# Patient Record
Sex: MALE | Race: WHITE | NOT HISPANIC OR LATINO | Employment: OTHER | URBAN - METROPOLITAN AREA
[De-identification: names, ages, dates, MRNs, and addresses within clinical notes are randomized per-mention and may not be internally consistent; named-entity substitution may affect disease eponyms.]

---

## 2021-01-14 DIAGNOSIS — Z23 NEED FOR VACCINATION: ICD-10-CM

## 2021-09-02 ENCOUNTER — APPOINTMENT (OUTPATIENT)
Dept: RADIOLOGY | Facility: HOSPITAL | Age: 76
End: 2021-09-02
Payer: MEDICARE

## 2021-09-02 ENCOUNTER — HOSPITAL ENCOUNTER (OUTPATIENT)
Facility: HOSPITAL | Age: 76
Setting detail: OBSERVATION
LOS: 1 days | Discharge: HOME | End: 2021-09-03
Attending: EMERGENCY MEDICINE | Admitting: INTERNAL MEDICINE
Payer: MEDICARE

## 2021-09-02 DIAGNOSIS — U07.1 LOWER RESPIRATORY TRACT INFECTION DUE TO COVID-19 VIRUS: Primary | ICD-10-CM

## 2021-09-02 DIAGNOSIS — U07.1 COVID-19: ICD-10-CM

## 2021-09-02 DIAGNOSIS — J22 LOWER RESPIRATORY TRACT INFECTION DUE TO COVID-19 VIRUS: Primary | ICD-10-CM

## 2021-09-02 DIAGNOSIS — R09.02 HYPOXIA: ICD-10-CM

## 2021-09-02 LAB
ALBUMIN SERPL-MCNC: 3.8 G/DL (ref 3.5–5.3)
ALP SERPL-CCNC: 52 U/L (ref 45–115)
ALT SERPL-CCNC: 18 U/L (ref 7–52)
ANION GAP SERPL CALC-SCNC: 7 MMOL/L (ref 3–11)
AST SERPL-CCNC: 39 U/L
BACTERIA #/AREA URNS AUTO: NORMAL /HPF
BASOPHILS # BLD AUTO: 0 10*3/UL
BASOPHILS NFR BLD AUTO: 1 % (ref 0–2)
BILIRUB SERPL-MCNC: 0.45 MG/DL (ref 0.2–1.4)
BILIRUB UR QL STRIP.AUTO: NEGATIVE
BUN SERPL-MCNC: 13 MG/DL (ref 7–25)
CALCIUM ALBUM COR SERPL-MCNC: 9 MG/DL (ref 8.6–10.3)
CALCIUM SERPL-MCNC: 8.8 MG/DL (ref 8.6–10.3)
CHLORIDE SERPL-SCNC: 104 MMOL/L (ref 98–107)
CK SERPL-CCNC: 131 U/L (ref 39–308)
CLARITY UR: CLEAR
CO2 SERPL-SCNC: 24 MMOL/L (ref 21–32)
COLOR UR: YELLOW
CREAT SERPL-MCNC: 0.89 MG/DL (ref 0.7–1.3)
CRP SERPL-MCNC: 33.7 MG/L
DELTA HIGH SENSITIVE TROPONIN I, LATE DRAW 2 HOUR: -5.5
DELTA HIGH SENSITIVITY TROPONIN I, 1 HOUR: -2.8
EOSINOPHIL # BLD AUTO: 0 10*3/UL
EOSINOPHIL NFR BLD AUTO: 0 % (ref 0–3)
ERYTHROCYTE [DISTWIDTH] IN BLOOD BY AUTOMATED COUNT: 15.1 % (ref 11.5–15)
ERYTHROCYTE [SEDIMENTATION RATE] IN BLOOD: 55 MM/HR
FERRITIN SERPL-MCNC: 211.9 NG/ML (ref 24–336)
FIBRIN D-DIMER (NG/ML) IN PLATELET POOR PLASMA: 683 NG/ML FEU
FIBRINOGEN PPP-MCNC: 543 MG/DL (ref 200–393)
GFR SERPL CREATININE-BSD FRML MDRD: 82 ML/MIN/1.73M*2
GLUCOSE BLDC GLUCOMTR-SCNC: 82 MG/DL (ref 70–105)
GLUCOSE BLDC GLUCOMTR-SCNC: 83 MG/DL (ref 70–105)
GLUCOSE SERPL-MCNC: 80 MG/DL (ref 70–105)
GLUCOSE UR STRIP.AUTO-MCNC: NEGATIVE MG/DL
HCT VFR BLD AUTO: 38.5 % (ref 38–50)
HGB BLD-MCNC: 13 G/DL (ref 13.2–17.2)
HGB UR QL STRIP.AUTO: NEGATIVE
INR BLD: 1.1
KETONES UR STRIP.AUTO-MCNC: NEGATIVE MG/DL
LDH SERPL L TO P-CCNC: 207 U/L (ref 87–246)
LEUKOCYTE ESTERASE UR QL STRIP: NEGATIVE
LYMPHOCYTES # BLD AUTO: 1 10*3/UL
LYMPHOCYTES NFR BLD AUTO: 31 % (ref 15–47)
MCH RBC QN AUTO: 29.9 PG (ref 29–34)
MCHC RBC AUTO-ENTMCNC: 33.8 G/DL (ref 32–36)
MCV RBC AUTO: 88.5 FL (ref 82–97)
MONOCYTES # BLD AUTO: 0.3 10*3/UL
MONOCYTES NFR BLD AUTO: 10 % (ref 5–13)
MUCOUS THREADS #/AREA URNS HPF: PRESENT /[HPF]
NEUTROPHILS # BLD AUTO: 2 10*3/UL
NEUTROPHILS NFR BLD AUTO: 59 % (ref 46–70)
NITRITE UR QL STRIP.AUTO: NEGATIVE
PH UR STRIP.AUTO: 5 PH
PLATELET # BLD AUTO: 122 10*3/UL (ref 130–350)
PMV BLD AUTO: 8.9 FL (ref 6.9–10.8)
POTASSIUM SERPL-SCNC: 4 MMOL/L (ref 3.5–5.1)
PROCALCITONIN SERPL-MCNC: 0.06 NG/ML
PROT SERPL-MCNC: 7 G/DL (ref 6–8.3)
PROT UR STRIP.AUTO-MCNC: 30 MG/DL
PROTHROMBIN TIME: 12.4 SECONDS (ref 9.4–12.5)
RBC # BLD AUTO: 4.35 10*6/ΜL (ref 4.1–5.8)
RBC #/AREA URNS AUTO: NORMAL /HPF
SODIUM SERPL-SCNC: 135 MMOL/L (ref 135–145)
SP GR UR STRIP.AUTO: 1.02 (ref 1–1.03)
SQUAMOUS #/AREA URNS AUTO: NORMAL /HPF
TROPONIN I SERPL-MCNC: 50.3 PG/ML
TROPONIN I SERPL-MCNC: 53 PG/ML
TROPONIN I SERPL-MCNC: 55.8 PG/ML
TROPONIN I SERPL-MCNC: 57.2 PG/ML
UROBILINOGEN UR STRIP.AUTO-MCNC: <2 E.U./DL
WBC # BLD AUTO: 3.4 10*3/UL (ref 3.7–9.6)
WBC #/AREA URNS AUTO: NORMAL /HPF

## 2021-09-02 PROCEDURE — 86140 C-REACTIVE PROTEIN: CPT | Performed by: EMERGENCY MEDICINE

## 2021-09-02 PROCEDURE — 71045 X-RAY EXAM CHEST 1 VIEW: CPT

## 2021-09-02 PROCEDURE — 93005 ELECTROCARDIOGRAM TRACING: CPT | Performed by: EMERGENCY MEDICINE

## 2021-09-02 PROCEDURE — 99220 *NO LONGER ACTIVE 2023 DO NOT USE* PR INITIAL OBSERVATION CARE/DAY 70 MINUTES: CPT | Performed by: INTERNAL MEDICINE

## 2021-09-02 PROCEDURE — (BLANK) HC ROOM ICU INTERMEDIATE

## 2021-09-02 PROCEDURE — 85652 RBC SED RATE AUTOMATED: CPT | Performed by: EMERGENCY MEDICINE

## 2021-09-02 PROCEDURE — 36415 COLL VENOUS BLD VENIPUNCTURE: CPT | Performed by: EMERGENCY MEDICINE

## 2021-09-02 PROCEDURE — 84145 PROCALCITONIN (PCT): CPT | Performed by: EMERGENCY MEDICINE

## 2021-09-02 PROCEDURE — 6370000100 HC RX 637 (ALT 250 FOR IP): Performed by: INTERNAL MEDICINE

## 2021-09-02 PROCEDURE — 85384 FIBRINOGEN ACTIVITY: CPT | Performed by: EMERGENCY MEDICINE

## 2021-09-02 PROCEDURE — 84484 ASSAY OF TROPONIN QUANT: CPT | Performed by: EMERGENCY MEDICINE

## 2021-09-02 PROCEDURE — 80053 COMPREHEN METABOLIC PANEL: CPT | Performed by: EMERGENCY MEDICINE

## 2021-09-02 PROCEDURE — 85610 PROTHROMBIN TIME: CPT | Performed by: EMERGENCY MEDICINE

## 2021-09-02 PROCEDURE — C9399 UNCLASSIFIED DRUGS OR BIOLOG: HCPCS | Performed by: INTERNAL MEDICINE

## 2021-09-02 PROCEDURE — 82947 ASSAY GLUCOSE BLOOD QUANT: CPT | Mod: QW

## 2021-09-02 PROCEDURE — 82550 ASSAY OF CK (CPK): CPT | Performed by: EMERGENCY MEDICINE

## 2021-09-02 PROCEDURE — 83615 LACTATE (LD) (LDH) ENZYME: CPT | Performed by: EMERGENCY MEDICINE

## 2021-09-02 PROCEDURE — 87040 BLOOD CULTURE FOR BACTERIA: CPT | Performed by: EMERGENCY MEDICINE

## 2021-09-02 PROCEDURE — 85025 COMPLETE CBC W/AUTO DIFF WBC: CPT | Performed by: EMERGENCY MEDICINE

## 2021-09-02 PROCEDURE — 85379 FIBRIN DEGRADATION QUANT: CPT | Performed by: EMERGENCY MEDICINE

## 2021-09-02 PROCEDURE — 82728 ASSAY OF FERRITIN: CPT | Performed by: EMERGENCY MEDICINE

## 2021-09-02 PROCEDURE — 6360000200 HC RX 636 W HCPCS (ALT 250 FOR IP): Performed by: INTERNAL MEDICINE

## 2021-09-02 PROCEDURE — 99285 EMERGENCY DEPT VISIT HI MDM: CPT | Performed by: EMERGENCY MEDICINE

## 2021-09-02 PROCEDURE — 81001 URINALYSIS AUTO W/SCOPE: CPT | Performed by: EMERGENCY MEDICINE

## 2021-09-02 RX ORDER — INSULIN GLARGINE 100 [IU]/ML
17 INJECTION, SOLUTION SUBCUTANEOUS 2 TIMES DAILY
COMMUNITY

## 2021-09-02 RX ORDER — ASPIRIN 81 MG/1
81 TABLET ORAL 3 TIMES WEEKLY
COMMUNITY

## 2021-09-02 RX ORDER — INSULIN GLARGINE 100 [IU]/ML
15 INJECTION, SOLUTION SUBCUTANEOUS 2 TIMES DAILY
Status: DISCONTINUED | OUTPATIENT
Start: 2021-09-02 | End: 2021-09-03 | Stop reason: HOSPADM

## 2021-09-02 RX ORDER — ENOXAPARIN SODIUM 100 MG/ML
40 INJECTION SUBCUTANEOUS
Status: DISCONTINUED | OUTPATIENT
Start: 2021-09-02 | End: 2021-09-03 | Stop reason: HOSPADM

## 2021-09-02 RX ORDER — NAPROXEN SODIUM 220 MG/1
TABLET, FILM COATED ORAL
Status: DISCONTINUED
Start: 2021-09-02 | End: 2021-09-03 | Stop reason: HOSPADM

## 2021-09-02 RX ORDER — ACETAMINOPHEN 500 MG
2000 TABLET ORAL 3 TIMES DAILY
COMMUNITY

## 2021-09-02 RX ORDER — ONDANSETRON 4 MG/1
4 TABLET, FILM COATED ORAL EVERY 6 HOURS PRN
Status: DISCONTINUED | OUTPATIENT
Start: 2021-09-02 | End: 2021-09-03 | Stop reason: HOSPADM

## 2021-09-02 RX ORDER — ONDANSETRON HYDROCHLORIDE 2 MG/ML
4 INJECTION, SOLUTION INTRAVENOUS EVERY 6 HOURS PRN
Status: DISCONTINUED | OUTPATIENT
Start: 2021-09-02 | End: 2021-09-03 | Stop reason: HOSPADM

## 2021-09-02 RX ORDER — NAPROXEN SODIUM 220 MG/1
81 TABLET, FILM COATED ORAL DAILY
Status: DISCONTINUED | OUTPATIENT
Start: 2021-09-02 | End: 2021-09-03 | Stop reason: HOSPADM

## 2021-09-02 RX ORDER — ACETAMINOPHEN 325 MG/1
325-650 TABLET ORAL EVERY 4 HOURS PRN
Status: DISCONTINUED | OUTPATIENT
Start: 2021-09-02 | End: 2021-09-03 | Stop reason: HOSPADM

## 2021-09-02 RX ORDER — METFORMIN HYDROCHLORIDE 1000 MG/1
1000 TABLET ORAL 2 TIMES DAILY WITH MEALS
COMMUNITY

## 2021-09-02 RX ORDER — RAMIPRIL 5 MG/1
5 CAPSULE ORAL DAILY
COMMUNITY
Start: 2021-08-27

## 2021-09-02 RX ORDER — SODIUM CHLORIDE 0.9 % (FLUSH) 0.9 %
3 SYRINGE (ML) INJECTION AS NEEDED
Status: DISCONTINUED | OUTPATIENT
Start: 2021-09-02 | End: 2021-09-03 | Stop reason: HOSPADM

## 2021-09-02 RX ORDER — INSULIN LISPRO 100 [IU]/ML
INJECTION, SOLUTION INTRAVENOUS; SUBCUTANEOUS SEE ADMIN INSTRUCTIONS
COMMUNITY
Start: 2021-06-21

## 2021-09-02 RX ORDER — SIMVASTATIN 40 MG/1
40 TABLET, FILM COATED ORAL NIGHTLY
COMMUNITY

## 2021-09-02 RX ADMIN — NAPROXEN SODIUM 81 MG: 220 TABLET, FILM COATED ORAL at 14:53

## 2021-09-02 RX ADMIN — INSULIN ASPART 15 UNITS: 100 INJECTION, SOLUTION INTRAVENOUS; SUBCUTANEOUS at 13:45

## 2021-09-02 RX ADMIN — ENOXAPARIN SODIUM 40 MG: 100 INJECTION SUBCUTANEOUS at 17:13

## 2021-09-02 RX ADMIN — INSULIN ASPART 12 UNITS: 100 INJECTION, SOLUTION INTRAVENOUS; SUBCUTANEOUS at 18:00

## 2021-09-02 RX ADMIN — REMDESIVIR 200 MG: 100 INJECTION, POWDER, LYOPHILIZED, FOR SOLUTION INTRAVENOUS at 14:47

## 2021-09-02 RX ADMIN — INSULIN GLARGINE 15 UNITS: 100 INJECTION, SOLUTION SUBCUTANEOUS at 21:00

## 2021-09-02 ASSESSMENT — ENCOUNTER SYMPTOMS
CONSTIPATION: 0
ARTHRALGIAS: 0
WHEEZING: 0
MYALGIAS: 1
CHEST TIGHTNESS: 1
EYE ITCHING: 0
ABDOMINAL PAIN: 0
DYSURIA: 0
SORE THROAT: 0
PALPITATIONS: 0
EYE DISCHARGE: 0
APPETITE CHANGE: 1
SHORTNESS OF BREATH: 1
DECREASED CONCENTRATION: 0
BRUISES/BLEEDS EASILY: 0
SHORTNESS OF BREATH: 0
APNEA: 0
RHINORRHEA: 0
DIFFICULTY URINATING: 0
NECK PAIN: 0
MYALGIAS: 0
BLOOD IN STOOL: 0
FEVER: 0
ACTIVITY CHANGE: 1
EYE PAIN: 0
FREQUENCY: 0
VOMITING: 0
HEADACHES: 1
POLYPHAGIA: 0
ADENOPATHY: 0
DIZZINESS: 0
CHILLS: 0
LIGHT-HEADEDNESS: 0
ACTIVITY CHANGE: 0
AGITATION: 0
WEAKNESS: 0
ABDOMINAL DISTENTION: 0
COLOR CHANGE: 0
DIARRHEA: 0
NAUSEA: 0
BACK PAIN: 0
HEMATURIA: 0
HEADACHES: 0
COUGH: 0
DIAPHORESIS: 0
APPETITE CHANGE: 0
FLANK PAIN: 0
CONFUSION: 0
FATIGUE: 1
POLYDIPSIA: 0

## 2021-09-02 ASSESSMENT — ACTIVITIES OF DAILY LIVING (ADL)
ASSISTIVE_DEVICES: EYEGLASSES
ADEQUATE_TO_COMPLETE_ADL: YES

## 2021-09-02 NOTE — ED PROVIDER NOTES
"    HPI:  Chief Complaint   Patient presents with   • Shortness of Breath     Covid positive 8/29       HPI  Patient is 76-year-old male who presents with shortness of breath.  Chest tightness also noted.  He was diagnosed with Covid 4 days ago.  He states he has had symptoms for 4 days.  He's test was prompted by the fact that his wife tested positive.  He denies any history of underlying lung disease.  Is a remote former smoker.  Does have diabetes on insulin.  Has a remote history of a bypass.  He is not been taking thing for the symptoms.  Not anything to make it better.  Walking makes it worse.  No history of PE or DVT.  Not on blood thinners.  Patient states he does not feel \"that sick\".  Had not had Covid vaccinations.  As far as he knows no previous Covid infections.  Was hypoxic to 88% upon arrival here.  Corrected with 2 L nasal cannula.  He states his wife was admitted and received therapy and is just been discharged home.  Not normally on inhalers. Patient is on insulin for his diabetes.      HISTORY:  Past Medical History:   Diagnosis Date   • Coronary artery disease involving native coronary artery of native heart without angina pectoris    • Diabetes mellitus (CMS/HCC) ([facility])        Past Surgical History:   Procedure Laterality Date   • CORONARY ARTERY BYPASS GRAFT     • ROTATOR CUFF REPAIR Bilateral    • TOTAL KNEE ARTHROPLASTY Right        History reviewed. No pertinent family history.    Social History     Tobacco Use   • Smoking status: Former Smoker     Packs/day: 4.00     Years: 18.00     Pack years: 72.00     Types: Cigarettes   • Smokeless tobacco: Never Used   Substance Use Topics   • Alcohol use: Not on file   • Drug use: Not on file         ROS:  Review of Systems   Constitutional: Positive for activity change, appetite change and fatigue. Negative for chills, diaphoresis and fever.   HENT: Negative for congestion, ear pain, rhinorrhea and sore throat.    Eyes: Negative for pain. "   Respiratory: Positive for chest tightness and shortness of breath. Negative for cough.    Cardiovascular: Negative for chest pain and leg swelling.   Gastrointestinal: Negative for abdominal pain, blood in stool, diarrhea, nausea and vomiting.   Genitourinary: Negative for dysuria, flank pain and hematuria.   Musculoskeletal: Negative for arthralgias, back pain, myalgias and neck pain.   Skin: Negative for rash.   Neurological: Negative for weakness and headaches.   Hematological: Does not bruise/bleed easily.   Psychiatric/Behavioral: Negative for agitation and confusion.   All other systems reviewed and are negative.      PE:  ED Triage Vitals [09/02/21 0834]   Temp Heart Rate Resp BP SpO2   37.1 °C (98.8 °F) 74 20 142/82 (!) 88 %      Temp Source Heart Rate Source Patient Position BP Location FiO2 (%)   Oral -- -- -- --       Physical Exam  Vitals and nursing note reviewed.   Constitutional:       General: He is not in acute distress.     Appearance: He is well-developed. He is not diaphoretic.   HENT:      Head: Normocephalic and atraumatic.   Eyes:      Conjunctiva/sclera: Conjunctivae normal.      Pupils: Pupils are equal, round, and reactive to light.   Cardiovascular:      Rate and Rhythm: Normal rate.   Pulmonary:      Effort: Pulmonary effort is normal.   Musculoskeletal:         General: Normal range of motion.      Cervical back: Normal range of motion.      Right lower leg: No tenderness. No edema.      Left lower leg: No tenderness. No edema.   Skin:     General: Skin is warm and dry.   Neurological:      General: No focal deficit present.      Mental Status: He is alert and oriented to person, place, and time.   Psychiatric:         Mood and Affect: Mood normal.         Behavior: Behavior normal.         Thought Content: Thought content normal.         Judgment: Judgment normal.         ED LABS:  Labs Reviewed   CBC WITH AUTO DIFFERENTIAL - Abnormal       Result Value    WBC 3.4 (*)     RBC 4.35    "   Hemoglobin 13.0 (*)     Hematocrit 38.5      MCV 88.5      MCH 29.9      MCHC 33.8      RDW 15.1 (*)     Platelets 122 (*)     MPV 8.9      Neutrophils% 59      Lymphocytes% 31      Monocytes% 10      Eosinophils% 0      Basophils% 1      ANC (auto diff) 2.00      Lymphocytes Absolute 1.00      Monocytes Absolute 0.30      Eosinophils Absolute 0.00      Basophils Absolute 0.00     SEDIMENTATION RATE, AUTOMATED - Abnormal    Sed Rate 55 (*)    HIGH SENSITIVE TROPONIN I - Abnormal    hsTnI 0 Hour 57.2 (*)    D-DIMER, QUANTITATIVE - Abnormal    D-Dimer, Quant (ng/mL) 683 (*)     Narrative:     D-dimer values increase with age and this can make VTE exclusion of an older population difficult. To address this, The [employer], based on best available evidence and recent guidelines, recommends that clinicians use age-adjusted D-dimer thresholds in patients greater than 50 years of age with: a) a low probability of PE who do not meet all Pulmonary Embolism Rule Out Criteria, or b) in those with intermediate probability of PE. The formula for an age-adjusted D-dimer cut-off is \"ageX10 ng/mL\". For example, a 60 year old patient would have an age-adjusted cut-off of 600 ng/mL FEU and an 80 year old would be 800 ng/mL FEU.  D-dimer values < or =500 ng/mL FEU may be used in conjunction with clinical pretest probability to exclude deep vein thrombosis (DVT) and pulmonary embolism (PE).   FIBRINOGEN - Abnormal    Fibrinogen 543 (*)    C-REACTIVE PROTEIN - Abnormal    CRP 33.7 (*)    URINALYSIS, DIPSTICK ONLY, FOR USE WITH MICROSCOPIC PANEL - Abnormal    Color, Urine Yellow      Clarity, Urine Clear      Specific Gravity, Urine 1.019      Leukocytes, Urine Negative      Nitrite, Urine Negative      Protein, Urine 30  (*)     Ketones, Urine Negative      Urobilinogen, Urine <2.0      Bilirubin, Urine Negative      Blood, Urine Negative      Glucose, Urine Negative      pH, Urine 5.0     COMPREHENSIVE METABOLIC " PANEL - Normal    Sodium 135      Potassium 4.0      Chloride 104      CO2 24      Anion Gap 7      BUN 13      Creatinine 0.89      Glucose 80      Calcium 8.8      AST 39      ALT (SGPT) 18      Alkaline Phosphatase 52      Total Protein 7.0      Albumin 3.8      Total Bilirubin 0.45      eGFR 82      Corrected Calcium 9.0      Narrative:     Estimated GFR calculated using the 2009 CKD-EPI creatinine equation.   CK - Normal    Total      PROTIME-INR - Normal    Protime 12.4      INR 1.1     LDH (LACTATE DEHYDROGENASE) - Normal         FERRITIN - Normal    Ferritin 211.9     PROCALCITONIN - Normal    Procalcitonin 0.06      Narrative:     Low risk of severe sepsis and/or septic shock.  This method for procalcitonin is not indicated to be used as an aid in decision making on antibiotic therapy for patients.   POCT GLUCOSE RESULTS ONLY - Normal    POC Glucose 82     BLOOD CULTURES, 2 SETS    Narrative:     The following orders were created for panel order Blood cultures, 2 sets.  Procedure                               Abnormality         Status                     ---------                               -----------         ------                     Blood culture, 1 set[[id number]]                              In process                 Blood culture, 1 set[[id number]]                              In process                   Please view results for these tests on the individual orders.   BLOOD CULTURE   BLOOD CULTURE   URINALYSIS WITH MICROSCOPIC    Narrative:     The following orders were created for panel order Urinalysis w/microscopic Urine, Clean Catch.  Procedure                               Abnormality         Status                     ---------                               -----------         ------                     Urinalysis, microscopic U...[[id number]]                      Final result               Urinalysis, dipstick Urin...[[id number]]  Abnormal            Final result                  Please view results for these tests on the individual orders.   POCT BLOOD GAS    Narrative:     The following orders were created for panel order POCT Blood Gas Blood, Venous.  Procedure                               Abnormality         Status                     ---------                               -----------         ------                     POCT Venous blood gas Blo...[[id number]]                      Final result                 Please view results for these tests on the individual orders.   URINALYSIS, MICROSCOPIC ONLY    RBC, Urine 0-2      WBC, Urine 0-4      Squamous Epithelial, Urine 0-4      Bacteria, Urine None seen      Mucus, Urine Present     POCT VENOUS BLOOD GAS   HIGH SENSITIVE TROPONIN I PANEL (0HR, 1HR, 2HR)    Narrative:     The following orders were created for panel order HS Troponin I Panel (0HR, 1HR, 2HR) Blood, Venous.  Procedure                               Abnormality         Status                     ---------                               -----------         ------                     HS Troponin I[[id number]]                                                                  Please view results for these tests on the individual orders.   HIGH SENSITIVE TROPONIN I         ED IMAGES:  XR chest portable 1 view   Final Result   IMPRESSION:   1.  Mild bilateral infiltrates. Consistent with COVID pneumonia.          ED PROCEDURES:  ECG 12 lead, Shortness of breath    Date/Time: 9/2/2021 10:13 AM  Performed by: Mary Matamoros MD  Authorized by: Mary Matamoros MD     ECG reviewed by ED Physician in the absence of a cardiologist: yes    Previous ECG:     Previous ECG:  Unavailable  Interpretation:     Interpretation: non-specific    Comments:      Normal sinus rhythm, rate 62, diffuse T wave flattening no old for comparison, does not meet STEMI or ischemia criteria.        ED COURSE:  ED Course as of Sep 02 1227   Thu Sep 02, 2021   0947 Positive [facility] test for COVID-19 4 days ago.     [KB]      ED Course User Index  [KB] Mary Matamoros MD          Sepsis Quality Bundle     MDM:  MDM  Patient is a 76-year-old male who presents with known Covid infection and now hypoxia. We will proceed with laboratory diagnostics, chest x-ray. Currently requiring 2 L nasal cannula. Diabetes, former smoker and unvaccinated would be risk factors for worsening disease. Will consult with hospitalist for inpatient treatment.        Final diagnoses:   [U07.1, J22] Lower respiratory tract infection due to COVID-19 virus   [R09.02] Hypoxia           A voice recognition program was used to aid in documentation of this record.  Sometimes words are not printed exactly as they were spoken.  While efforts were made to carefully edit and correct any inaccuracies, some areas may be present; please take these into context.  Please contact the physician if areas are identified.        Mary Matamoros MD  09/02/21 8061

## 2021-09-02 NOTE — H&P
History and Physical    09/02/21  12:09 PM    Chief Complaint   Patient presents with   • Shortness of Breath     Covid positive 8/29       HPI  Patient is a 76 y.o. male who presents with 4 days of symptoms of mild to moderate headache muscle aches nonproductive cough.  He tested positive for Covid on the same day on 8/29, he believes he contracted it from his wife.  He is not vaccinated.  He denies any fever chills no significant dyspnea at rest but does have some with ambulation, denies any lightheadedness or dizziness.  He does note some central dull chest pain but says that that is resolved at this point in time.  Does not feel like his symptoms have significantly worsened over the past 4 days.  Does manage his diabetes at home with 15 units of Lantus twice daily as well as a self-directed insulin carb ratio at home with blood sugars ranging between 70 and 160.  He has only been hospitalized in the past for procedures.  Says overall he has been in good health    Past Medical History:   Diagnosis Date   • Coronary artery disease involving native coronary artery of native heart without angina pectoris    • Diabetes mellitus (CMS/HCC) ([facility])        Past Surgical History:   Procedure Laterality Date   • CORONARY ARTERY BYPASS GRAFT     • ROTATOR CUFF REPAIR Bilateral    • TOTAL KNEE ARTHROPLASTY Right        History reviewed. No pertinent family history.    Social History:    reports that he has quit smoking. He has never used smokeless tobacco.  Smokes between 1 and 4 packs/day for 18 years quit 40 years ago   has no history on file for alcohol use.   has no history on file for drug use.    Not on File    Prior to Admission medications    Medication Sig Start Date End Date Taking? Authorizing Provider   insulin glargine (Lantus U-100 Insulin) 100 unit/mL injection Inject under the skin nightly   Yes Historical Provider, MD   simvastatin (ZOCOR) 40 mg tablet Take 40 mg by mouth nightly   Yes Historical Provider, MD    metFORMIN (Glucophage) 1,000 mg tablet Take 1,000 mg by mouth 2 (two) times a day with meals   Yes Historical Provider, MD         There is no immunization history on file for this patient.    Review of Systems   Constitutional: Negative for activity change, appetite change, chills and fever.   HENT: Negative for congestion and hearing loss.    Eyes: Negative for discharge and itching.   Respiratory: Positive for chest tightness. Negative for apnea, shortness of breath and wheezing.    Cardiovascular: Positive for chest pain. Negative for palpitations and leg swelling.   Gastrointestinal: Negative for abdominal distention, constipation and diarrhea.   Endocrine: Negative for polydipsia and polyphagia.   Genitourinary: Negative for difficulty urinating, frequency and urgency.   Musculoskeletal: Positive for myalgias. Negative for arthralgias.   Skin: Negative for color change, pallor and rash.   Allergic/Immunologic: Negative for environmental allergies and food allergies.   Neurological: Positive for headaches. Negative for dizziness, weakness and light-headedness.   Hematological: Negative for adenopathy. Does not bruise/bleed easily.   Psychiatric/Behavioral: Negative for agitation, behavioral problems, confusion and decreased concentration.       Temp:  [37.1 °C (98.8 °F)] 37.1 °C (98.8 °F)  Heart Rate:  [53-74] 60  Resp:  [11-24] 16  BP: (117-142)/(67-82) 117/67    Physical Exam  Constitutional:       General: He is not in acute distress.     Appearance: Normal appearance. He is not ill-appearing, toxic-appearing or diaphoretic.   HENT:      Head: Normocephalic and atraumatic.      Nose: Nose normal. No congestion.      Mouth/Throat:      Mouth: Mucous membranes are moist.      Pharynx: Oropharynx is clear. No oropharyngeal exudate or posterior oropharyngeal erythema.   Eyes:      General: No scleral icterus.     Extraocular Movements: Extraocular movements intact.      Pupils: Pupils are equal, round, and  reactive to light.   Cardiovascular:      Rate and Rhythm: Normal rate and regular rhythm.      Pulses: Normal pulses.      Heart sounds: Normal heart sounds. No murmur heard.   No gallop.    Pulmonary:      Effort: Pulmonary effort is normal. No respiratory distress.      Breath sounds: Normal breath sounds. No stridor. No wheezing.   Abdominal:      General: Abdomen is flat. Bowel sounds are normal. There is no distension.      Palpations: Abdomen is soft.      Tenderness: There is no abdominal tenderness. There is no guarding.   Musculoskeletal:         General: No swelling, deformity or signs of injury. Normal range of motion.      Cervical back: Normal range of motion and neck supple. No rigidity.   Lymphadenopathy:      Cervical: No cervical adenopathy.   Skin:     General: Skin is warm and dry.      Capillary Refill: Capillary refill takes less than 2 seconds.      Coloration: Skin is not jaundiced.      Findings: No bruising.   Neurological:      General: No focal deficit present.      Mental Status: He is alert and oriented to person, place, and time.      Cranial Nerves: No cranial nerve deficit.      Motor: No weakness.   Psychiatric:         Mood and Affect: Mood normal.         Behavior: Behavior normal.         Thought Content: Thought content normal.         Judgment: Judgment normal.         Basic:   Lab Results   Component Value Date     09/02/2021    K 4.0 09/02/2021     09/02/2021    CO2 24 09/02/2021    BUN 13 09/02/2021    CREATININE 0.89 09/02/2021    GLUCOSE 80 09/02/2021    CALCIUM 8.8 09/02/2021     Adult ABG: No results found for: PHART, GMC0CPB, PO2ART, OFJ3RUD, BEART, B4DVILDR, HGBART, JBB9IXF  CBC with Platelet:    Lab Results   Component Value Date    WBC 3.4 (L) 09/02/2021    HGB 13.0 (L) 09/02/2021    HCT 38.5 09/02/2021     (L) 09/02/2021    RBC 4.35 09/02/2021    MCV 88.5 09/02/2021    MCH 29.9 09/02/2021    MCHC 33.8 09/02/2021    RDW 15.1 (H) 09/02/2021    MPV  8.9 09/02/2021     Comp:   Lab Results   Component Value Date     09/02/2021    K 4.0 09/02/2021     09/02/2021    CO2 24 09/02/2021    BUN 13 09/02/2021    CREATININE 0.89 09/02/2021    GLUCOSE 80 09/02/2021    CALCIUM 8.8 09/02/2021    PROT 7.0 09/02/2021    ALBUMIN 3.8 09/02/2021    AST 39 09/02/2021    ALT 18 09/02/2021    ALKPHOS 52 09/02/2021    BILITOT 0.45 09/02/2021     Coags:   Lab Results   Component Value Date    PT 12.4 09/02/2021    INR 1.1 09/02/2021     Magnesium: No results found for: MG  U/A Macroscopic:    Lab Results   Component Value Date    COLORU Yellow 09/02/2021    SPECGRAVU 1.019 09/02/2021    NATASHA 5.0 09/02/2021    LEUKOCYTESU Negative 09/02/2021    NITRITEU Negative 09/02/2021    PROTUR 30  (A) 09/02/2021    GLUCOSEU Negative 09/02/2021    KETONESU Negative 09/02/2021    UROBILINOGEN <2.0 09/02/2021    BLOODU Negative 09/02/2021     Blood (Aerobic and Anaerobic):  No results found for: BLOODCX  Cerebrospinal Fluid (CSF): No results found for: CSFCX  Urine: No results found for: URINECX    XR chest portable 1 view    Result Date: 9/2/2021  Narrative: Chest x-ray one view- AP upright portable at 849 09/02/2021 Clinical History:  Shortness of breath. Additional history: COVID positive on 11 9/2/2021 Comparison/s: None Findings: Given the positioning and degree of inspiration, the heart, pulmonary vessels and mediastinum are normal. Mild patchy bilateral infiltrates, right greater than left. Bones are unremarkable.     Impression: IMPRESSION: 1.  Mild bilateral infiltrates. Consistent with COVID pneumonia.      Active Problems:  No Active Problems: There are no active problems currently on the Problem List. Please update the Problem List and refresh.      Assessment:  COVID-19 pneumonia  Acute hypoxic respiratory failure secondary above  Mild troponin elevation with atypical chest pain, secondary to above  History of coronary disease with previous CABG 40 years ago  Tobacco use  history  Type 2 diabetes on basal bolus insulin      Plan:    Admit to inpatient stepdown unit, 10th floor  I have reviewed the patient's labs imaging chart and discussed the case with Dr. Matamoros in the ER  Patient is a 76-year-old male who presents with 4 days of symptoms of Covid  Overall feels that he is doing rather well but was found to have 80% saturation on room air in the emergency department placed on 2 L with recovery into the mid 90s.  Does have bilateral infiltrates on chest x-ray from COVID-19, his PCR test was performed at [facility] on 8/29  Labs reviewed showed no significant kidney or liver injury no white count elevation no signs of anemia, but does have a mildly elevated troponin at 57 high-sensitivity will continue to trend with 2 more values this is likely heart strain from his Covid disease and hypoxia  If his troponins continue elevated may need to evaluate with echocardiogram or even cardiology consult, continue on aspirin and statin  EKG sinus rhythm 62, does have some Q R waves V1 through V3, as well as III and aVF, no acute ST or T wave changes  Encourage pulmonary toilet with incentive spirometry, wean O2 as able, no history of COPD will not initiate inhalers  Discussed treatment options with the patient including remdesivir and dexamethasone.  He qualifies for remdesivir will start this at this time.  He does not qualify for dexamethasone as he has not had 5 days of symptoms this would be tomorrow, depending on how he does he may choose to be treated with dexamethasone or may not as he is concerned about his blood sugar elevations on steroids  Will order patient's home regimen of insulin which is 15 units Lantus twice daily and insulin carb ratio, however patient wants to direct his own dosing and injections we will have him sign a release  Okay to eat, regular diabetic diet ordered  His 4C score is elevated with 33 to 36% risk of in-hospital mortality from COVID-19 this was reviewed with  patient, will monitor closely due to his increased risk for severe Covid and potential need for ICU or more invasive oxygen measures  CODE STATUS extensively reviewed with the patient he is DNR/DNI  Likely course of hospitalization 2 to 3 days as he is just at the beginning of his symptoms and is high risk for decompensation    DVT prophylaxis: Lovenox  DNR/DNI-discussed with patient    I spent 65 minutes obtaining history and performing a physical, of greater than 50% was spent in direct patient education/counseling, and coordination of care.        To Portillo DO  9/2/2021  12:09 PM

## 2021-09-03 VITALS
WEIGHT: 181.7 LBS | DIASTOLIC BLOOD PRESSURE: 60 MMHG | OXYGEN SATURATION: 93 % | HEIGHT: 71 IN | TEMPERATURE: 97.7 F | BODY MASS INDEX: 25.44 KG/M2 | SYSTOLIC BLOOD PRESSURE: 120 MMHG | HEART RATE: 61 BPM | RESPIRATION RATE: 17 BRPM

## 2021-09-03 PROCEDURE — C9399 UNCLASSIFIED DRUGS OR BIOLOG: HCPCS | Performed by: INTERNAL MEDICINE

## 2021-09-03 PROCEDURE — 6360000200 HC RX 636 W HCPCS (ALT 250 FOR IP): Performed by: INTERNAL MEDICINE

## 2021-09-03 PROCEDURE — 99217 *NO LONGER ACTIVE 2023 DO NOT USE* PR OBSERVATION CARE DISCHARGE MANAGEMENT: CPT | Performed by: INTERNAL MEDICINE

## 2021-09-03 PROCEDURE — G0378 HOSPITAL OBSERVATION PER HR: HCPCS

## 2021-09-03 PROCEDURE — 96365 THER/PROPH/DIAG IV INF INIT: CPT

## 2021-09-03 PROCEDURE — 94761 N-INVAS EAR/PLS OXIMETRY MLT: CPT

## 2021-09-03 RX ADMIN — REMDESIVIR 100 MG: 100 INJECTION, POWDER, LYOPHILIZED, FOR SOLUTION INTRAVENOUS at 15:18

## 2021-09-03 RX ADMIN — NAPROXEN SODIUM 81 MG: 220 TABLET, FILM COATED ORAL at 09:05

## 2021-09-03 NOTE — INTERDISCIPLINARY/THERAPY
9/3 Patient provided the MOON form, CM additionally reviewed form with patient by phone.   Patient states that he has an excellent supplement and is not concerned.

## 2021-09-03 NOTE — NURSING END OF SHIFT
"Nursing End of Shift Summary:    Patient: Pardeep Guadalupe  MRN: [id number]  : 1945, Age: 76 y.o.    Location: [address] [place] [address]    Nursing Goals  Clinical Goals for the Shift: Monitor respiratory status, VS, monitor telemetry, monitor glucs, safety, comfort, respiratory supportive cares    Narrative Summary of Progress Toward Clinical Goals:  Pt remained on 1 liter O2 nc overnight to maintain O2 sats > 90%. Pt restless up oob standing at bedside until HS and describes himself as a \"pacer\", but eventually turned in at HS. Blood glucose self-checked by pt was 130 at HS; pt administered his own Lantus 15 units at HS; pt has signed a release form r/t self admininstration of insulin and monitoring of blood sugars. Telemetry SR and SB overnight. Pt up independently in room and remained safe all night.    Barriers to Goals/Nursing Concerns:  O2 needs.    New Patient or Family Concerns/Issues:  None expressed.     Shift Summary:   Significant Events & Communications to Providers (last 12 hours)     Last 5 Values    No documentation.             Oxygen Usage (last 12 hours)     Last 5 Values     Row Name 21 1700 21 1952 21 0220             Oxygen Weaning Trial by Nursing    Is Patient on Room Air OR on the Same Amount of O2 as at Home? No No No      Are You Performing the QShift O2 Weaning Trial? No No No              Mobility (last 12 hours)     Last 5 Values     Row Name 21 1700 21 1900                Mobility    Activity Ambulate in room;Bathroom privileges Ambulate in [place];Bathroom privileges;Bedrest;Up ad raoul;Stand at bedside;Dangle;Sleeping       Level of Assistance Independent Independent       Distance Ambulated (feet) 20 Feet --       Distance Ambulated (Meters Calculated) 6.1 Meters --       Distance Ambulated (Meters Calculated)(Do Not Use) 6.1 Feet --                 Urethral Catheter     Active Urethral Catheter     None            Active Lines     Active Central venous catheter / " Peripherally inserted central catheter / Implantable Port / Hemodialysis catheter / Midline Catheter     None              Infusing Medications   Medication Dose Last Rate     PRN Medications   Medication Dose Last Admin   • sodium chloride  3 mL     • acetaminophen  325-650 mg     • ondansetron  4 mg      Or   • ondansetron  4 mg     • insulin aspart  0-25 Units       _________________________  Yasmin Taylor RN  09/03/21 4:18 AM

## 2021-09-03 NOTE — INTERDISCIPLINARY/THERAPY
Case Management Discharge Note    Phone # [phone]  Discharge Disposition: Home   Needs transportation assistance at DC:  SO for transport    Specialty Referrals: O2 referral sent to [facility].  [facility] is processing order and plans to deliver a POC for patient today. CM delivered Pulse Oximeter.  FMR appt set for 9/13    Narrative: Home

## 2021-09-03 NOTE — INTERDISCIPLINARY/THERAPY
Case Management Admission Note    Phone # [phone]    Living Situation: Spouse/significant other Other (Comment) (Motorhome)            ADLs: Independent  Stairs: Yes 3 into motorhome.  HME/CPAP: None      Oxygen: No      Home Health:No     Current Resources:        Diabetes/supplies: Do you have Diabetes?: Yes  Do you have diabetic supplies?: Yes  PCP: Jared Portillo MD  Funding: Medicare/[employer]  Pharmacy:[id number] - [place] AT [facility]    Support Person: Primary Emergency Contact: Latosha Madsen, Home Phone: [phone], Mobile Phone: [phone], Relation: Significant Other  Needs transportation assistance at DC: No     Discharge Needs/Barriers:  May need O2 at discharge, awaiting O2 determination.  Will need Pulse Oximeter at discharge.     Narrative: Patient admitted for Hypoxia related to Covid.  [facility] at [facility].  Awaiting RT O2 determination. CM identified that  does not have any portable concentrators and they are not open to supplying O2 for non local address.  [facility] may have a POC and is opne to supplying patient O2.  CM reviewed status of O2 providers and patient would like to proceed with [facility].     Patient independent and active at baseline.  Travels in  as home with SO,  Well supported by SO..  Patient plans to stay locally in town and would like follow up appointment arranged for 2-3 weeks out.      Dispo: Home to  locally.

## 2021-09-03 NOTE — SECONDARY REVIEW
Code 44  Patient admitted inpatient status. Patient did not meet inpatient MCG criteria.     _xx____Dr. Verduzco recommends observation status.   _____R1 physician advisory service recommends observation status.    Contacted attending provider and obtained observation order  Notified Moises Jama and Lauren Blue via email  Contacted discharge associate and notified to obtain MOON.   Entered Code 44 condition code in Epic.

## 2021-09-03 NOTE — SECONDARY REVIEW
Chart was reviewed.  Patient 76 male who had been hospitalized for shortness of breath diagnosed as Covid has required 1 L of oxygen even though patient was hypoxic on presentation had improved markedly did not receive remdesivir.  1 day stay in my opinion appropriate for observation level of care.

## 2021-09-03 NOTE — PLAN OF CARE
Problem: Infection Control  Goal: MINIMIZE THE ACQUISITION AND TRANSMISSION OF INFECTIOUS AGENTS  Description: INTERVENTIONS:  1. Isolate patient with suspected/diagnosed communicable disease  2. Place on designated isolation precautions  3. Maintain isolation techniques  4. Perform hand hygiene before and after each patient care activity  5. [place] universal precautions  6. Wear PPE as directed for type of isolation  7. Administer antibiotic therapy as ordered  8. Clean the environment appropriately after each patient use  9. Clean patient care equipment after each patient use as it leaves the room  10. Limit number of visitors, as appropriate  Outcome: Progressing  Flowsheets (Taken 9/2/2021 1852)  MINIMIZE THE ACQUISITION AND TRANSMISSION OF INFECT AGENTS:   Isolate patient with suspected/diagnosed communicable disease   Perform hand hygiene before and after each patient care activity   Administer antibiotic therapy as ordered   Limit number of visitors, as appropriate   Educate the patient/visitors on hand hygiene technique and need to complete upon entering/exiting the patient's room   Clean the environment appropriately after each patient use   [place] universal precautions   Place on designated isolation precautions   Maintain isolation techniques   Wear PPE as directed for type of isolation   Clean patient care equipment after each patient use as it leaves the room   Educate the patient/visitors on how to avoid the spread of infection     Problem: Respiratory - Adult  Goal: Achieves optimal ventilation and oxygenation  Description: INTERVENTIONS:  1. Assess for changes in respiratory status  2. Assess for changes in mentation and behavior  3. Position to facilitate oxygenation and minimize respiratory effort  4. Oxygen supplementation based on oxygen saturation or ABGs  5. Assess patient's ability to cough effectively  6. Encourage broncho-pulmonary hygiene including cough, deep breathe  7. Assess the  "need for suctioning   8. Assess and instruct to report SOB or any respiratory difficulty  9. Respiratory Therapy support as indicated, including medications and treatment.  Outcome: Progressing  Flowsheets (Taken 9/2/2021 1852)  Achieves optimal ventilation and oxygenation:   Assess for changes in respiratory status   Position to facilitate oxygenation and minimize respiratory effort   Assess patient's ability to cough effectively   Assess the need for suctioning   Respiratory Therapy support as indicated, including medications and treatment   Assess and instruct to report shortness of breath or any respiratory difficulty   Encourage broncho-pulmonary hygiene including cough, deep breathe   Oxygen supplementation based on oxygen saturation or arterial blood gases   Assess for changes in mentation and behavior     Problem: Infection Control  Goal: MINIMIZE THE ACQUISITION AND TRANSMISSION OF INFECTIOUS AGENTS  Description: INTERVENTIONS:  1. Isolate patient with suspected/diagnosed communicable disease  2. Place on designated isolation precautions  3. Maintain isolation techniques  4. Perform hand hygiene before and after each patient care activity  5. Wear PPE as directed for type of isolation  6. Clean the environment appropriately after each patient use  7. Clean patient care equipment after each patient use as it leaves the room  8. Educate the patient on the \"No Visitor\" restrictions related to COVID 19    Outcome: Progressing  Flowsheets (Taken 9/2/2021 1852)  MINIMIZE THE ACQUISITION AND TRANSMISSION OF INFECT AGENTS:   Maintain isolation techniques   Isolate patient with suspected/diagnosed communicable disease   Wear PPE as directed for type of isolation   Clean patient care equipment after each patient use as it leaves the room   Place on designated isolation precautions   Perform hand hygiene before and after each patient care activity   Clean the environment appropriately after each patient use   Educate " "the patient on the \"No Visitor\" restrictions related to COVID 19     Problem: Cardiovascular - Adult  Goal: Maintains optimal cardiac output and hemodynamic stability  Description: INTERVENTIONS:  1. Monitor vital signs and rhythm  2. Monitor for hypotension and other signs of decreased cardiac output  3. Administer and titrate ordered vasoactive medications to optimize hemodynamic stability  4. Monitor for fluid overload/dehydration, weight gain, shortness of breath and activity intolerance  5. Assess quality of pulses, capillary refill, edema, sensation, skin color and temperature      Outcome: Progressing  Flowsheets (Taken 9/2/2021 1852)  Maintain optimal cardiac output and hemodynamic stability:   Monitor vital signs and rhythm   Administer and titrate ordered vasoactive medications to optimize hemodynamic stability   Assess quality of pulses, capillary refill, edema, sensation, skin color and temperature   Monitor for hypotension and other signs of decreased cardiac output   Monitor for fluid overload/dehydration, weight gain, shortness of breath and activity intolerance     Problem: Musculoskeletal - Adult  Goal: Return mobility to safest level of function  Description: INTERVENTIONS:  1. Assess patient stability and activity tolerance for standing, transferring and ambulating w/ or w/o assistive devices  2. Assist with transfers and ambulation using safe practices  3. Ensure adequate protection for wounds/incisions during mobilization  4. Obtain PT/OT and other consults as needed  5. Instruct patient/family in ordered activity level  Outcome: Progressing  Flowsheets (Taken 9/2/2021 1852)  Return mobility to safest level of function:   Assess patient stability and activity tolerance for standing, transferring and ambulating with or without assistive devices   Obtain PT/OT and other consults as needed   Instruct patient/family in ordered activity level   Assist with transfers and ambulation using safe " practices   Ensure adequate protection for wounds/incisions during mobilization     Problem: Daily Care  Goal: Daily care needs are met  Description: INTERVENTIONS:   1. Assess and monitor skin integrity  2. Identify patients at risk for skin breakdown on admission and per policy  3. Assess and monitor ability to perform self care and identify potential discharge needs  4. Assist patient with activities of daily living as needed  5. Encourage independent activity per ability   6. Provide mouth care   7. Include patient/family/caregiver in decisions related to daily care   8. Initiate prevention measures to maintain skin integrity as needed  Outcome: Progressing  Flowsheets (Taken 9/2/2021 1852)  Daily care needs are met:   Assess and monitor skin integrity   Assess and monitor ability to perform self care and identify potential discharge needs   Assist patient with activities of daily living as needed   Include patient/family/caregiver in decisions related to daily care   Identify patients at risk for skin breakdown on admission and per policy   Assess skin integrity/risk for skin breakdown   Provide mouth care   Encourage independent activity per ability     Problem: Pain - Adult  Goal: Verbalizes/displays adequate comfort level or baseline comfort level  Description: INTERVENTIONS:  1. Encourage patient to monitor pain and request interventions  2. Assess pain using the appropriate pain scale  3. Administer analgesics based on type and severity of pain and evaluate response      Outcome: Progressing  Flowsheets (Taken 9/2/2021 1852)  Verbalizes/displays adequate comfort level or baseline comfort level:   Encourage patient to monitor pain and request interventions   Administer analgesics based on type and severity of pain and evaluate response   Assess pain using the appropriate pain scale

## 2021-09-03 NOTE — DISCHARGE SUMMARY
Inpatient Discharge Summary    BRIEF OVERVIEW  Admitting Provider: To Portillo DO  Discharge Provider: To Portillo DO  Primary Care Physician at Discharge: Jared Portillo [phone]     Admission Date: 9/2/2021     Discharge Date: 9/3/2021    Primary Discharge Diagnosis  COVID-19 pneumonia    Secondary Discharge Diagnosis  Acute hypoxic respiratory failure secondary above  Mild troponin elevation with atypical chest pain, secondary to above  History of coronary disease with previous CABG 40 years ago  Tobacco use history  Type 2 diabetes on basal bolus insulin    Discharge Disposition  Disposition: Home  Code Status at Discharge: Full    Active Issues Requiring Follow-up  None    Outpatient Follow-Up  Future Appointments   Date Time Provider Department Center   9/13/2021  9:45 AM Mally Perez MD [facility]         Test Results Pending at Discharge  Pending Labs     Order Current Status    Blood culture, 1 set Preliminary result    Blood culture, 1 set Preliminary result    Blood cultures, 2 sets Preliminary result          DETAILS OF HOSPITAL STAY    Presenting Problem/History of Present Illness  Chief Complaint   Patient presents with   • Shortness of Breath     Covid positive 8/29       Hospital Course    Patient is a 76 y.o. male who presents with 4 days of symptoms of mild to moderate headache muscle aches nonproductive cough.  He tested positive for Covid on the same day on 8/29, he believes he contracted it from his wife.  He is not vaccinated.  He denies any fever chills no significant dyspnea at rest but does have some with ambulation, denies any lightheadedness or dizziness.  He does note some central dull chest pain but says that that is resolved at this point in time.  Does not feel like his symptoms have significantly worsened over the past 4 days.  Does manage his diabetes at home with 15 units of Lantus twice daily as well as a self-directed insulin carb ratio at home with blood sugars  ranging between 70 and 160.  He has only been hospitalized in the past for procedures.  Says overall he has been in good health.  Options for treatment of COVID-19 pneumonia were reviewed with with the patient, he did not want to start on steroids due to concern of hyperglycemia with his diabetes.  Patient desired to manage his own diabetes in the hospital so he signed an AMA form for self-management of diabetes.  He was started on remdesivir given doses on 9/2 and 9/3.  He noted no difference in his symptoms during his admission, O2 determination was performed on 9/3 found to require 1 L/min.  This was arranged for him at discharge.  Patient discharged home in stable condition on 9/3 all questions answered on day discharge    Consults: ED CONSULT TO HOSPITALIST  Procedures: None  Pertinent Test Results:   XR chest portable 1 view    Result Date: 9/2/2021  Narrative: Chest x-ray one view- AP upright portable at 849 09/02/2021 Clinical History:  Shortness of breath. Additional history: COVID positive on 11 9/2/2021 Comparison/s: None Findings: Given the positioning and degree of inspiration, the heart, pulmonary vessels and mediastinum are normal. Mild patchy bilateral infiltrates, right greater than left. Bones are unremarkable.     Impression: IMPRESSION: 1.  Mild bilateral infiltrates. Consistent with COVID pneumonia.         Your medication list      CONTINUE taking these medications      Instructions Last Dose Given Next Dose Due   aspirin 81 mg EC tablet           cholecalciferol (vitamin D3) 50 mcg (2,000 unit) capsule           Glucophage 1,000 mg tablet  Generic drug: metFORMIN           HumaLOG U-100 Insulin 100 unit/mL injection  Generic drug: insulin lispro           Lantus U-100 Insulin 100 unit/mL injection  Generic drug: insulin glargine           ramipriL 5 mg capsule  Commonly known as: ALTACE           simvastatin 40 mg tablet  Commonly known as: ZOCOR                  Physical Exam at  Discharge  Discharge Condition: Good  Heart Rate: 61  Resp: 18  BP: 97/51  Temp: 36.8 °C (98.3 °F)  Weight: 82.4 kg (181 lb 11.2 oz)    Constitutional:  Well-developed well-nourished. No apparent distress.  Respiratory: Lungs clear to auscultation. No wheezes, rales, or rhonchi.  Cardiovascular: Regular. Normal S1-S2. No murmurs. No JVD. No peripheral edema.  Abdomen: Nontender, nondistended, normoactive bowel sounds, no guarding, no rigidity, no hepatosplenomegaly.  Extremities: No cyanosis, no swelling, no edema. No calf tenderness.  Skin: Warm dry. No rashes, no jaundice, no lesions.  Neuro: Alert and oriented ×3. nonfocal.

## 2021-09-03 NOTE — MEDICATION HISTORY SPECIALIST NOTES
[facility], [place] -01    CSN: [id number]  : [id number]    Information sources:  EPIC  Complete Dispense Report  Go Reconcile    Allergies verified.    Patient interviewed via phone who provided all history. Patient verified medications and provided last doses. Verified with Complete Dispense Report.    New medications added:  Aspirin  Vitamin d  humalog  Ramipril     Discrepancies:  lantus is now 15 units twice daily

## 2021-09-03 NOTE — INTERDISCIPLINARY/THERAPY
09/03/21 1300   O2 Discharge Determination   Activity Resting   $ Multiple SpO2 Levels Obtained? - RT Charge Only Yes   O2 Discharge Determination Activity: Resting   Resting Lowest SPO2 (%) on Room Air 90 %   Resting Comment 90-92% room air at rest   O2 Discharge Determination Activity: Exercise   Exercise Lowest SPO2 (%) on Room Air 86 %   Exercise Oxygen Requirement (lpm) 1 lpm   Exercise SPO2 (%) on Final Need of Oxygen 91 %   Exercise Comment Marched in place, walked around room. 1lpm with acitvity.

## 2021-09-04 ENCOUNTER — HOME MONITORING (OUTPATIENT)
Dept: FAMILY MEDICINE | Facility: CLINIC | Age: 76
End: 2021-09-04

## 2021-09-07 LAB
BACTERIA BLD CULT: NORMAL
BACTERIA BLD CULT: NORMAL

## 2023-01-09 ENCOUNTER — OFFICE VISIT (OUTPATIENT)
Dept: URBAN - METROPOLITAN AREA CLINIC 51 | Facility: CLINIC | Age: 78
End: 2023-01-09
Payer: MEDICARE

## 2023-01-09 DIAGNOSIS — D48.1 NEOPLASM OF UNCERTAIN BEHAVIOR OF CONNCTV/SOFT TISS: Primary | ICD-10-CM

## 2023-01-09 PROCEDURE — 92285 EXTERNAL OCULAR PHOTOGRAPHY: CPT | Performed by: OPHTHALMOLOGY

## 2023-01-09 PROCEDURE — 99204 OFFICE O/P NEW MOD 45 MIN: CPT | Performed by: OPHTHALMOLOGY

## 2023-01-09 NOTE — IMPRESSION/PLAN
Impression: Neoplasm of uncertain behavior of connctv/soft tiss: D48.1. Plan: Neoplasm LLL. Will seek authorization from insurance and will preform neoplasm excision with biopsy next visit. Concerning for malignancy due to telangiectatic vessels and irregular borders. Hx of multiple previous basal cells.

## 2023-02-16 ENCOUNTER — OFFICE VISIT (OUTPATIENT)
Dept: URBAN - METROPOLITAN AREA CLINIC 44 | Facility: CLINIC | Age: 78
End: 2023-02-16
Payer: MEDICARE

## 2023-02-16 DIAGNOSIS — D48.1 NEOPLASM OF UNCERTAIN BEHAVIOR OF CONNCTV/SOFT TISS: Primary | ICD-10-CM

## 2023-02-16 PROCEDURE — 99213 OFFICE O/P EST LOW 20 MIN: CPT | Performed by: OPHTHALMOLOGY

## 2023-02-16 PROCEDURE — 67840 REMOVE EYELID LESION: CPT | Performed by: OPHTHALMOLOGY

## 2023-02-16 RX ORDER — NEOMYCIN SULFATE, POLYMYXIN B SULFATE AND DEXAMETHASONE 3.5; 10000; 1 MG/G; [USP'U]/G; MG/G
OINTMENT OPHTHALMIC
Qty: 1 | Refills: 2 | Status: ACTIVE
Start: 2023-02-16

## 2023-02-16 NOTE — IMPRESSION/PLAN
Impression: Neoplasm of uncertain behavior of connctv/soft tiss: D48.1. Plan: The eyelid was cleaned and then anesthetized with 2% lidocaine. The site was excised with Nu scissors, flush with the adjacent skin. The area was cauterized as necessary to achieve hemostasis. Antibiotic ointment was applied to the site. The patient tolerated the procedure well, and the procedure was completed without complications. LLL Specimen removed completely and submitted for pathology.

## 2023-12-19 ENCOUNTER — APPOINTMENT (RX ONLY)
Dept: URBAN - NONMETROPOLITAN AREA CLINIC 25 | Facility: CLINIC | Age: 78
Setting detail: DERMATOLOGY
End: 2023-12-19

## 2023-12-19 DIAGNOSIS — L82.1 OTHER SEBORRHEIC KERATOSIS: ICD-10-CM

## 2023-12-19 DIAGNOSIS — L57.0 ACTINIC KERATOSIS: ICD-10-CM

## 2023-12-19 PROCEDURE — 11102 TANGNTL BX SKIN SINGLE LES: CPT

## 2023-12-19 PROCEDURE — ? TREATMENT REGIMEN

## 2023-12-19 PROCEDURE — 17003 DESTRUCT PREMALG LES 2-14: CPT

## 2023-12-19 PROCEDURE — ? LIQUID NITROGEN

## 2023-12-19 PROCEDURE — 17000 DESTRUCT PREMALG LESION: CPT | Mod: 59

## 2023-12-19 PROCEDURE — ? COUNSELING

## 2023-12-19 PROCEDURE — ? BIOPSY BY SHAVE METHOD

## 2023-12-19 ASSESSMENT — LOCATION SIMPLE DESCRIPTION DERM
LOCATION SIMPLE: RIGHT TEMPLE
LOCATION SIMPLE: RIGHT CHEEK
LOCATION SIMPLE: LEFT CHEEK
LOCATION SIMPLE: LEFT FOREHEAD
LOCATION SIMPLE: RIGHT FOREHEAD

## 2023-12-19 ASSESSMENT — LOCATION ZONE DERM: LOCATION ZONE: FACE

## 2023-12-19 ASSESSMENT — PAIN INTENSITY VAS: HOW INTENSE IS YOUR PAIN 0 BEING NO PAIN, 10 BEING THE MOST SEVERE PAIN POSSIBLE?: NO PAIN

## 2023-12-19 ASSESSMENT — LOCATION DETAILED DESCRIPTION DERM
LOCATION DETAILED: RIGHT MEDIAL TEMPLE
LOCATION DETAILED: RIGHT FOREHEAD
LOCATION DETAILED: RIGHT SUPERIOR CENTRAL BUCCAL CHEEK
LOCATION DETAILED: LEFT SUPERIOR FOREHEAD
LOCATION DETAILED: LEFT INFERIOR LATERAL FOREHEAD
LOCATION DETAILED: LEFT CENTRAL MALAR CHEEK

## 2023-12-19 NOTE — PROCEDURE: BIOPSY BY SHAVE METHOD
Detail Level: Detailed
Depth Of Biopsy: dermis
Was A Bandage Applied: Yes
Size Of Lesion In Cm: 1.3
X Size Of Lesion In Cm: 1.1
Biopsy Type: H and E
Biopsy Method: Dermablade
Anesthesia Type: 1% lidocaine with epinephrine
Anesthesia Volume In Cc (Will Not Render If 0): 0.5
Additional Anesthesia Volume In Cc (Will Not Render If 0): 0
Hemostasis: Drysol
Wound Care: Petrolatum
Dressing: bandage
Destruction After The Procedure: No
Type Of Destruction Used: Curettage
Curettage Text: The wound bed was treated with curettage after the biopsy was performed.
Cryotherapy Text: The wound bed was treated with cryotherapy after the biopsy was performed.
Electrodesiccation Text: The wound bed was treated with electrodesiccation after the biopsy was performed.
Electrodesiccation And Curettage Text: The wound bed was treated with electrodesiccation and curettage after the biopsy was performed.
Silver Nitrate Text: The wound bed was treated with silver nitrate after the biopsy was performed.
Lab: [id number]
Lab Facility: [id number]
Consent: Written consent was obtained and risks were reviewed including but not limited to scarring, infection, bleeding, scabbing, incomplete removal, nerve damage and allergy to anesthesia.
Post-Care Instructions: I reviewed with the patient in detail post-care instructions. Patient is to keep the biopsy site dry overnight, and then apply bacitracin twice daily until healed. Patient may apply hydrogen peroxide soaks to remove any crusting.
Notification Instructions: Patient will be notified of biopsy results. However, patient instructed to call the office if not contacted within 2 weeks.
Billing Type: Third-Party Bill
Information: Selecting Yes will display possible errors in your note based on the variables you have selected. This validation is only offered as a suggestion for you. PLEASE NOTE THAT THE VALIDATION TEXT WILL BE REMOVED WHEN YOU FINALIZE YOUR NOTE. IF YOU WANT TO FAX A PRELIMINARY NOTE YOU WILL NEED TO TOGGLE THIS TO 'NO' IF YOU DO NOT WANT IT IN YOUR FAXED NOTE.

## 2023-12-19 NOTE — PROCEDURE: LIQUID NITROGEN
Detail Level: Detailed
Application Tool (Optional): Liquid Nitrogen Sprayer
Render Note In Bullet Format When Appropriate: No
Post-Care Instructions: I reviewed with the patient in detail post-care instructions. Patient is to wear sunprotection, and avoid picking at any of the treated lesions. Pt may apply Vaseline to crusted or scabbing areas.
Duration Of Freeze Thaw-Cycle (Seconds): 12
Show Aperture Variable?: Yes
Number Of Freeze-Thaw Cycles: 2 freeze-thaw cycles
Consent: The patient's consent was obtained including but not limited to risks of crusting, scabbing, blistering, scarring, darker or lighter pigmentary change, recurrence, incomplete removal and infection.